# Patient Record
Sex: MALE | Race: WHITE | Employment: FULL TIME | URBAN - METROPOLITAN AREA
[De-identification: names, ages, dates, MRNs, and addresses within clinical notes are randomized per-mention and may not be internally consistent; named-entity substitution may affect disease eponyms.]

---

## 2019-10-24 ENCOUNTER — HOSPITAL ENCOUNTER (EMERGENCY)
Age: 19
Discharge: HOME | End: 2019-10-24
Attending: EMERGENCY MEDICINE

## 2019-10-24 VITALS
WEIGHT: 264 LBS | BODY MASS INDEX: 39.1 KG/M2 | TEMPERATURE: 97.9 F | SYSTOLIC BLOOD PRESSURE: 137 MMHG | DIASTOLIC BLOOD PRESSURE: 84 MMHG | HEIGHT: 69 IN | OXYGEN SATURATION: 99 % | RESPIRATION RATE: 18 BRPM | HEART RATE: 102 BPM

## 2019-10-24 DIAGNOSIS — L98.9 SKIN LESION: Primary | ICD-10-CM

## 2019-10-24 PROCEDURE — 99282 EMERGENCY DEPT VISIT SF MDM: CPT

## 2019-10-24 ASSESSMENT — PAIN SCALES - GENERAL: PAINLEVEL_OUTOF10: 4

## 2021-01-07 PROCEDURE — 93005 ELECTROCARDIOGRAM TRACING: CPT | Performed by: EMERGENCY MEDICINE

## 2021-01-07 PROCEDURE — 99283 EMERGENCY DEPT VISIT LOW MDM: CPT

## 2021-01-07 ASSESSMENT — PAIN SCALES - GENERAL: PAINLEVEL_OUTOF10: 7

## 2021-01-07 ASSESSMENT — PAIN DESCRIPTION - LOCATION: LOCATION: CHEST

## 2021-01-07 ASSESSMENT — PAIN DESCRIPTION - DESCRIPTORS: DESCRIPTORS: DISCOMFORT

## 2021-01-08 ENCOUNTER — HOSPITAL ENCOUNTER (EMERGENCY)
Age: 21
Discharge: HOME | End: 2021-01-08
Attending: EMERGENCY MEDICINE

## 2021-01-08 ENCOUNTER — APPOINTMENT (OUTPATIENT)
Dept: GENERAL RADIOLOGY | Age: 21
End: 2021-01-08

## 2021-01-08 VITALS
RESPIRATION RATE: 20 BRPM | WEIGHT: 265 LBS | OXYGEN SATURATION: 100 % | HEIGHT: 70 IN | BODY MASS INDEX: 37.94 KG/M2 | HEART RATE: 87 BPM | DIASTOLIC BLOOD PRESSURE: 74 MMHG | SYSTOLIC BLOOD PRESSURE: 124 MMHG | TEMPERATURE: 98.5 F

## 2021-01-08 VITALS
TEMPERATURE: 98.4 F | BODY MASS INDEX: 38.02 KG/M2 | RESPIRATION RATE: 16 BRPM | DIASTOLIC BLOOD PRESSURE: 81 MMHG | SYSTOLIC BLOOD PRESSURE: 120 MMHG | WEIGHT: 265 LBS | HEART RATE: 95 BPM | OXYGEN SATURATION: 96 %

## 2021-01-08 DIAGNOSIS — R07.89 ATYPICAL CHEST PAIN: Primary | ICD-10-CM

## 2021-01-08 DIAGNOSIS — R00.2 PALPITATIONS: Primary | ICD-10-CM

## 2021-01-08 LAB
ANION GAP SERPL CALCULATED.3IONS-SCNC: 11 MMOL/L (ref 3–16)
ANION GAP SERPL CALCULATED.3IONS-SCNC: 11 MMOL/L (ref 3–16)
BASOPHILS ABSOLUTE: 0 K/UL (ref 0–0.2)
BASOPHILS ABSOLUTE: 0.1 K/UL (ref 0–0.2)
BASOPHILS RELATIVE PERCENT: 0.5 %
BASOPHILS RELATIVE PERCENT: 0.9 %
BUN BLDV-MCNC: 11 MG/DL (ref 7–20)
BUN BLDV-MCNC: 12 MG/DL (ref 7–20)
CALCIUM SERPL-MCNC: 9.4 MG/DL (ref 8.3–10.6)
CALCIUM SERPL-MCNC: 9.9 MG/DL (ref 8.3–10.6)
CHLORIDE BLD-SCNC: 101 MMOL/L (ref 99–110)
CHLORIDE BLD-SCNC: 101 MMOL/L (ref 99–110)
CO2: 26 MMOL/L (ref 21–32)
CO2: 27 MMOL/L (ref 21–32)
CREAT SERPL-MCNC: 0.8 MG/DL (ref 0.9–1.3)
CREAT SERPL-MCNC: 0.8 MG/DL (ref 0.9–1.3)
D DIMER: <200 NG/ML DDU (ref 0–229)
EKG ATRIAL RATE: 170 BPM
EKG DIAGNOSIS: NORMAL
EKG Q-T INTERVAL: 258 MS
EKG QRS DURATION: 74 MS
EKG QTC CALCULATION (BAZETT): 433 MS
EKG R AXIS: 81 DEGREES
EKG T AXIS: 19 DEGREES
EKG VENTRICULAR RATE: 170 BPM
EOSINOPHILS ABSOLUTE: 0.1 K/UL (ref 0–0.6)
EOSINOPHILS ABSOLUTE: 0.2 K/UL (ref 0–0.6)
EOSINOPHILS RELATIVE PERCENT: 1.2 %
EOSINOPHILS RELATIVE PERCENT: 1.7 %
GFR AFRICAN AMERICAN: >60
GFR AFRICAN AMERICAN: >60
GFR NON-AFRICAN AMERICAN: >60
GFR NON-AFRICAN AMERICAN: >60
GLUCOSE BLD-MCNC: 76 MG/DL (ref 70–99)
GLUCOSE BLD-MCNC: 96 MG/DL (ref 70–99)
HCT VFR BLD CALC: 45.3 % (ref 40.5–52.5)
HCT VFR BLD CALC: 45.5 % (ref 40.5–52.5)
HEMOGLOBIN: 15.2 G/DL (ref 13.5–17.5)
HEMOGLOBIN: 15.3 G/DL (ref 13.5–17.5)
LYMPHOCYTES ABSOLUTE: 1.7 K/UL (ref 1–5.1)
LYMPHOCYTES ABSOLUTE: 2 K/UL (ref 1–5.1)
LYMPHOCYTES RELATIVE PERCENT: 16.6 %
LYMPHOCYTES RELATIVE PERCENT: 21.4 %
MCH RBC QN AUTO: 29 PG (ref 26–34)
MCH RBC QN AUTO: 29.1 PG (ref 26–34)
MCHC RBC AUTO-ENTMCNC: 33.5 G/DL (ref 31–36)
MCHC RBC AUTO-ENTMCNC: 33.7 G/DL (ref 31–36)
MCV RBC AUTO: 86.3 FL (ref 80–100)
MCV RBC AUTO: 86.7 FL (ref 80–100)
MONOCYTES ABSOLUTE: 0.8 K/UL (ref 0–1.3)
MONOCYTES ABSOLUTE: 0.9 K/UL (ref 0–1.3)
MONOCYTES RELATIVE PERCENT: 7.5 %
MONOCYTES RELATIVE PERCENT: 9.9 %
NEUTROPHILS ABSOLUTE: 6.2 K/UL (ref 1.7–7.7)
NEUTROPHILS ABSOLUTE: 7.4 K/UL (ref 1.7–7.7)
NEUTROPHILS RELATIVE PERCENT: 66.5 %
NEUTROPHILS RELATIVE PERCENT: 73.8 %
PDW BLD-RTO: 13.9 % (ref 12.4–15.4)
PDW BLD-RTO: 14 % (ref 12.4–15.4)
PLATELET # BLD: 219 K/UL (ref 135–450)
PLATELET # BLD: 230 K/UL (ref 135–450)
PMV BLD AUTO: 8.3 FL (ref 5–10.5)
PMV BLD AUTO: 8.4 FL (ref 5–10.5)
POTASSIUM REFLEX MAGNESIUM: 3.9 MMOL/L (ref 3.5–5.1)
POTASSIUM REFLEX MAGNESIUM: 4 MMOL/L (ref 3.5–5.1)
RBC # BLD: 5.23 M/UL (ref 4.2–5.9)
RBC # BLD: 5.27 M/UL (ref 4.2–5.9)
SODIUM BLD-SCNC: 138 MMOL/L (ref 136–145)
SODIUM BLD-SCNC: 139 MMOL/L (ref 136–145)
TROPONIN: <0.01 NG/ML
WBC # BLD: 10.1 K/UL (ref 4–11)
WBC # BLD: 9.3 K/UL (ref 4–11)

## 2021-01-08 PROCEDURE — 84484 ASSAY OF TROPONIN QUANT: CPT

## 2021-01-08 PROCEDURE — 36415 COLL VENOUS BLD VENIPUNCTURE: CPT

## 2021-01-08 PROCEDURE — 71045 X-RAY EXAM CHEST 1 VIEW: CPT

## 2021-01-08 PROCEDURE — 99283 EMERGENCY DEPT VISIT LOW MDM: CPT

## 2021-01-08 PROCEDURE — 80048 BASIC METABOLIC PNL TOTAL CA: CPT

## 2021-01-08 PROCEDURE — 84443 ASSAY THYROID STIM HORMONE: CPT

## 2021-01-08 PROCEDURE — 85379 FIBRIN DEGRADATION QUANT: CPT

## 2021-01-08 PROCEDURE — 93005 ELECTROCARDIOGRAM TRACING: CPT | Performed by: EMERGENCY MEDICINE

## 2021-01-08 PROCEDURE — 85025 COMPLETE CBC W/AUTO DIFF WBC: CPT

## 2021-01-08 ASSESSMENT — PAIN DESCRIPTION - ONSET: ONSET: ON-GOING

## 2021-01-08 ASSESSMENT — PAIN DESCRIPTION - LOCATION: LOCATION: CHEST

## 2021-01-08 ASSESSMENT — PAIN SCALES - GENERAL: PAINLEVEL_OUTOF10: 7

## 2021-01-08 ASSESSMENT — PAIN DESCRIPTION - DIRECTION: RADIATING_TOWARDS: L AND R

## 2021-01-08 ASSESSMENT — PAIN DESCRIPTION - DESCRIPTORS: DESCRIPTORS: ACHING

## 2021-01-08 ASSESSMENT — PAIN DESCRIPTION - FREQUENCY: FREQUENCY: CONTINUOUS

## 2021-01-08 NOTE — ED PROVIDER NOTES
[address] [facility]  EMERGENCY DEPARTMENT ENCOUNTER          ATTENDING PHYSICIAN NOTE       Date of evaluation: 1/7/2021    Chief Complaint     Palpitations      History of Present Illness     Jill Griffin is a 23 y.o. male who presents with complaint of palpitations and a feeling tightness in his chest.  He says the palpitations were a feeling of his heart beating rapidly in his chest but not necessarily irregularly. Says he was at rest when it started several hours ago. He developed mild feeling of chest tightness with it, not true dyspnea, and has not had shortness of breath is prevented him from being able to ambulate. He does not appear to be exertionally worse. Denies fevers chills or cough. Says he currently just feels the feeling of his heart beating fast.  Denies swelling in arms or legs. No trauma. Can think of no inciting factors for this. Denies caffeine or drug use. Review of Systems     Review of Systems  Pertinent positives and negatives are listed in the HPI; otherwise all systems are reviewed and were negative. Past Medical, Surgical, Family, and Social History     He has a past medical history of Myocarditis ([facility] [address] He has a past surgical history that includes Appendectomy (06/30/2016). His family history is not on file. He reports that he has never smoked. He does not have any smokeless tobacco history on file. He reports that he does not drink alcohol or use drugs. Medications     Previous Medications    No medications on file       Allergies     He has No Known Allergies. Physical Exam     INITIAL VITALS: BP: (!) 144/84,  , Pulse: 124, Resp: 20, SpO2: 100 %   Physical Exam  Constitutional:       Appearance: Normal appearance. HENT:      Head: Normocephalic. Eyes:      Conjunctiva/sclera: Conjunctivae normal.   Neck:      Musculoskeletal: Normal range of motion. Cardiovascular:      Rate and Rhythm: Regular rhythm. Tachycardia present.    Pulmonary:      Effort: Troponin <0.01 <0.01 ng/mL   D-dimer, quantitative (Lab)   Result Value Ref Range    D-Dimer, Quant <200 0 - 229 ng/mL DDU   Troponin   Result Value Ref Range    Troponin <0.01 <0.01 ng/mL       ED BEDSIDE ULTRASOUND:      RECENT VITALS:  BP: (!) 134/124, , Pulse: 124, Resp: 20, SpO2: 99 %     Procedures         ED Course     Nursing Notes, Past Medical Hx, Past Surgical Hx, Social Hx,Allergies, and Family Hx were reviewed. patient was given the following medications:  No orders of the defined types were placed in this encounter. CONSULTS:  None    MEDICAL DECISIONMAKING / ASSESSMENT / PLAN     Soheila Moise is a 23 y.o. male presents with a feeling of his heart beating rapidly this evening. His lab work-up is grossly unremarkable with a negative D-dimer which was obtained given his tachycardia, and negative troponin x2. His EKG as some significant artifact of scaring one lead but otherwise nonischemic and demonstrates sinus tachycardia. Tachycardia has improved spontaneously prior to my evaluation to approximately 100, and after further observation and a bag of fluids in the emergency department is at 90. He is asymptomatic currently. Discussed these findings with patient will have him follow-up with his PCP and return for any worsening symptoms or other concerns. .    Clinical Impression     1.  Palpitations        Disposition     PATIENT REFERRED TO:  SOCORRO Morrow - CNP  [id number] [address]  [phone]    In 3 days  As needed      DISCHARGE MEDICATIONS:  New Prescriptions    No medications on file       DISPOSITION Decision To Discharge 01/08/2021 05:11:13 AM         Marion Méndez MD  01/08/21 9118

## 2021-01-09 LAB
EKG ATRIAL RATE: 105 BPM
EKG DIAGNOSIS: NORMAL
EKG P AXIS: 44 DEGREES
EKG P-R INTERVAL: 140 MS
EKG Q-T INTERVAL: 352 MS
EKG QRS DURATION: 96 MS
EKG QTC CALCULATION (BAZETT): 465 MS
EKG R AXIS: 68 DEGREES
EKG T AXIS: 26 DEGREES
EKG VENTRICULAR RATE: 105 BPM
TSH REFLEX: 1.03 UIU/ML (ref 0.27–4.2)

## 2021-01-09 NOTE — ED TRIAGE NOTES
Pt was seen yesterday for rapid heart rate with chest pain. Today pt states he is experiencing the rapid heart rate with chest pain again at times the pain gets worse. Pt states the tachycardia began around noon there are times were it's not bad but it has not let up.

## 2021-01-09 NOTE — ED PROVIDER NOTES
12.4 - 15.4 %    Platelets 104 646 - 958 K/uL    MPV 8.3 5.0 - 10.5 fL    Neutrophils % 73.8 %    Lymphocytes % 16.6 %    Monocytes % 7.5 %    Eosinophils % 1.2 %    Basophils % 0.9 %    Neutrophils Absolute 7.4 1.7 - 7.7 K/uL    Lymphocytes Absolute 1.7 1.0 - 5.1 K/uL    Monocytes Absolute 0.8 0.0 - 1.3 K/uL    Eosinophils Absolute 0.1 0.0 - 0.6 K/uL    Basophils Absolute 0.1 0.0 - 0.2 K/uL   Basic Metabolic Panel w/ Reflex to MG   Result Value Ref Range    Sodium 139 136 - 145 mmol/L    Potassium reflex Magnesium 3.9 3.5 - 5.1 mmol/L    Chloride 101 99 - 110 mmol/L    CO2 27 21 - 32 mmol/L    Anion Gap 11 3 - 16    Glucose 76 70 - 99 mg/dL    BUN 12 7 - 20 mg/dL    CREATININE 0.8 (L) 0.9 - 1.3 mg/dL    GFR Non-African American >60 >60    GFR African American >60 >60    Calcium 9.9 8.3 - 10.6 mg/dL   Troponin   Result Value Ref Range    Troponin <0.01 <0.01 ng/mL       ED BEDSIDE ULTRASOUND:  Bedside cardiac ultrasound performed to evaluate for pericardial effusion. No pericardial effusion seen on my evaluation. RECENT VITALS:  BP: 120/81, Temp: 98.4 °F (36.9 °C),Pulse: 95, Resp: 16, SpO2: 96 %     Procedures         ED Course     Nursing Notes, Past Medical Hx, Past Surgical Hx, Social Hx, Allergies, and Family Hx werereviewed. The patient was given thefollowing medications:  Orders Placed This Encounter   Medications    aluminum & magnesium hydroxide-simethicone (MAALOX) 30 mL, lidocaine viscous hcl (XYLOCAINE) 5 mL (GI COCKTAIL)    naproxen (NAPROXEN) 500 MG EC tablet     Sig: Take 1 tablet by mouth 2 times daily (with meals)     Dispense:  30 tablet     Refill:  0       CONSULTS:  None    MEDICAL DECISION MAKING / ASSESSMENT / Margo Talya is a 23 y.o. male on my examination patient is very well-appearing complaining of atypical reproducible anterior chest pain. He was seen here yesterday had a negative work-up. Repeat troponin was performed.   Bedside cardiac ultrasound did not show any pericardial effusion. I did not feel that chest x-ray was necessary to repeat as is it was done less than 24 hours ago. Ultimately, atypical for ACS although given his history of myopericarditis he would benefit from cardiology follow-up. Troponin was negative today. Laboratory studies are overall reassuring. Ultimately I believe that he is safe for discharge home. I will discharge with a short course of naproxen. He was given cardiology information for follow-up. Clinical Impression     1.  Atypical chest pain        Disposition     PATIENT REFERRED TO:  Gloria Leach MD  [address]  [phone]      for cardiology follow up given history of myopericarditis      DISCHARGE MEDICATIONS:  Discharge Medication List as of 1/8/2021  9:54 PM      START taking these medications    Details   naproxen (NAPROXEN) 500 MG EC tablet Take 1 tablet by mouth 2 times daily (with meals), Disp-30 tablet, R-[id number]             DISPOSITION Decision To Discharge 01/08/2021 09:35:30 PM         Remi Faye MD  01/08/21 4000

## 2022-07-12 ENCOUNTER — HOSPITAL ENCOUNTER (EMERGENCY)
Age: 22
Discharge: HOME | End: 2022-07-13
Attending: EMERGENCY MEDICINE

## 2022-07-12 ENCOUNTER — APPOINTMENT (OUTPATIENT)
Dept: GENERAL RADIOLOGY | Age: 22
End: 2022-07-12

## 2022-07-12 DIAGNOSIS — H69.83 DYSFUNCTION OF BOTH EUSTACHIAN TUBES: ICD-10-CM

## 2022-07-12 DIAGNOSIS — R51.9 ACUTE NONINTRACTABLE HEADACHE, UNSPECIFIED HEADACHE TYPE: ICD-10-CM

## 2022-07-12 DIAGNOSIS — R42 DIZZINESS: ICD-10-CM

## 2022-07-12 DIAGNOSIS — R00.0 TACHYCARDIA: Primary | ICD-10-CM

## 2022-07-12 LAB
AMPHETAMINE SCREEN, URINE: NORMAL
ANION GAP SERPL CALCULATED.3IONS-SCNC: 11 MMOL/L (ref 3–16)
BACTERIA: ABNORMAL /HPF
BARBITURATE SCREEN URINE: NORMAL
BASOPHILS ABSOLUTE: 0 K/UL (ref 0–0.2)
BASOPHILS RELATIVE PERCENT: 0.2 %
BENZODIAZEPINE SCREEN, URINE: NORMAL
BILIRUBIN URINE: NEGATIVE
BLOOD, URINE: NEGATIVE
BUN BLDV-MCNC: 8 MG/DL (ref 7–20)
CALCIUM SERPL-MCNC: 9 MG/DL (ref 8.3–10.6)
CANNABINOID SCREEN URINE: NORMAL
CHLORIDE BLD-SCNC: 102 MMOL/L (ref 99–110)
CLARITY: CLEAR
CO2: 23 MMOL/L (ref 21–32)
COCAINE METABOLITE SCREEN URINE: NORMAL
COLOR: YELLOW
CREAT SERPL-MCNC: 0.8 MG/DL (ref 0.9–1.3)
EKG ATRIAL RATE: 127 BPM
EKG DIAGNOSIS: NORMAL
EKG P AXIS: 32 DEGREES
EKG P-R INTERVAL: 132 MS
EKG Q-T INTERVAL: 292 MS
EKG QRS DURATION: 86 MS
EKG QTC CALCULATION (BAZETT): 424 MS
EKG R AXIS: 87 DEGREES
EKG T AXIS: 21 DEGREES
EKG VENTRICULAR RATE: 127 BPM
EOSINOPHILS ABSOLUTE: 0.1 K/UL (ref 0–0.6)
EOSINOPHILS RELATIVE PERCENT: 1.2 %
GFR AFRICAN AMERICAN: >60
GFR NON-AFRICAN AMERICAN: >60
GLUCOSE BLD-MCNC: 113 MG/DL (ref 70–99)
GLUCOSE URINE: NEGATIVE MG/DL
HCT VFR BLD CALC: 46.1 % (ref 40.5–52.5)
HEMOGLOBIN: 15 G/DL (ref 13.5–17.5)
KETONES, URINE: NEGATIVE MG/DL
LEUKOCYTE ESTERASE, URINE: NEGATIVE
LYMPHOCYTES ABSOLUTE: 0.7 K/UL (ref 1–5.1)
LYMPHOCYTES RELATIVE PERCENT: 7.9 %
Lab: NORMAL
MCH RBC QN AUTO: 27.9 PG (ref 26–34)
MCHC RBC AUTO-ENTMCNC: 32.6 G/DL (ref 31–36)
MCV RBC AUTO: 85.7 FL (ref 80–100)
METHADONE SCREEN, URINE: NORMAL
MICROSCOPIC EXAMINATION: ABNORMAL
MONOCYTES ABSOLUTE: 0.6 K/UL (ref 0–1.3)
MONOCYTES RELATIVE PERCENT: 7.2 %
NEUTROPHILS ABSOLUTE: 7.2 K/UL (ref 1.7–7.7)
NEUTROPHILS RELATIVE PERCENT: 83.5 %
NITRITE, URINE: NEGATIVE
OPIATE SCREEN URINE: NORMAL
OXYCODONE URINE: NORMAL
PDW BLD-RTO: 14.6 % (ref 12.4–15.4)
PH UA: 7.5
PH UA: 7.5 (ref 5–8)
PHENCYCLIDINE SCREEN URINE: NORMAL
PLATELET # BLD: 202 K/UL (ref 135–450)
PMV BLD AUTO: 9 FL (ref 5–10.5)
POTASSIUM REFLEX MAGNESIUM: 4 MMOL/L (ref 3.5–5.1)
PROPOXYPHENE SCREEN: NORMAL
PROTEIN UA: NEGATIVE MG/DL
RBC # BLD: 5.38 M/UL (ref 4.2–5.9)
RBC UA: ABNORMAL /HPF (ref 0–4)
SODIUM BLD-SCNC: 136 MMOL/L (ref 136–145)
SPECIFIC GRAVITY UA: 1.02 (ref 1–1.03)
TROPONIN: <0.01 NG/ML
URINE TYPE: ABNORMAL
UROBILINOGEN, URINE: 0.2 E.U./DL
WBC # BLD: 8.7 K/UL (ref 4–11)
WBC UA: ABNORMAL /HPF (ref 0–5)

## 2022-07-12 PROCEDURE — 96374 THER/PROPH/DIAG INJ IV PUSH: CPT

## 2022-07-12 PROCEDURE — 71046 X-RAY EXAM CHEST 2 VIEWS: CPT

## 2022-07-12 PROCEDURE — 84484 ASSAY OF TROPONIN QUANT: CPT

## 2022-07-12 PROCEDURE — 99285 EMERGENCY DEPT VISIT HI MDM: CPT

## 2022-07-12 PROCEDURE — 80307 DRUG TEST PRSMV CHEM ANLYZR: CPT

## 2022-07-12 PROCEDURE — 85025 COMPLETE CBC W/AUTO DIFF WBC: CPT

## 2022-07-12 PROCEDURE — 80048 BASIC METABOLIC PNL TOTAL CA: CPT

## 2022-07-12 PROCEDURE — 96375 TX/PRO/DX INJ NEW DRUG ADDON: CPT

## 2022-07-12 PROCEDURE — 6360000002 HC RX W HCPCS: Performed by: EMERGENCY MEDICINE

## 2022-07-12 PROCEDURE — 2580000003 HC RX 258: Performed by: STUDENT IN AN ORGANIZED HEALTH CARE EDUCATION/TRAINING PROGRAM

## 2022-07-12 PROCEDURE — 36415 COLL VENOUS BLD VENIPUNCTURE: CPT

## 2022-07-12 PROCEDURE — 81001 URINALYSIS AUTO W/SCOPE: CPT

## 2022-07-12 PROCEDURE — 6370000000 HC RX 637 (ALT 250 FOR IP): Performed by: PHYSICIAN ASSISTANT

## 2022-07-12 PROCEDURE — 84439 ASSAY OF FREE THYROXINE: CPT

## 2022-07-12 PROCEDURE — 84443 ASSAY THYROID STIM HORMONE: CPT

## 2022-07-12 PROCEDURE — 93005 ELECTROCARDIOGRAM TRACING: CPT | Performed by: STUDENT IN AN ORGANIZED HEALTH CARE EDUCATION/TRAINING PROGRAM

## 2022-07-12 PROCEDURE — 6360000002 HC RX W HCPCS: Performed by: PHYSICIAN ASSISTANT

## 2022-07-12 RX ORDER — KETOROLAC TROMETHAMINE 30 MG/ML
15 INJECTION, SOLUTION INTRAMUSCULAR; INTRAVENOUS ONCE
Status: COMPLETED | OUTPATIENT
Start: 2022-07-12 | End: 2022-07-12

## 2022-07-12 RX ORDER — 0.9 % SODIUM CHLORIDE 0.9 %
500 INTRAVENOUS SOLUTION INTRAVENOUS ONCE
Status: COMPLETED | OUTPATIENT
Start: 2022-07-12 | End: 2022-07-12

## 2022-07-12 RX ORDER — PROCHLORPERAZINE EDISYLATE 5 MG/ML
10 INJECTION INTRAMUSCULAR; INTRAVENOUS ONCE
Status: COMPLETED | OUTPATIENT
Start: 2022-07-12 | End: 2022-07-12

## 2022-07-12 RX ORDER — DIPHENHYDRAMINE HYDROCHLORIDE 50 MG/ML
50 INJECTION INTRAMUSCULAR; INTRAVENOUS ONCE
Status: COMPLETED | OUTPATIENT
Start: 2022-07-12 | End: 2022-07-12

## 2022-07-12 RX ORDER — SODIUM CHLORIDE, SODIUM LACTATE, POTASSIUM CHLORIDE, AND CALCIUM CHLORIDE .6; .31; .03; .02 G/100ML; G/100ML; G/100ML; G/100ML
30 INJECTION, SOLUTION INTRAVENOUS ONCE
Status: COMPLETED | OUTPATIENT
Start: 2022-07-12 | End: 2022-07-12

## 2022-07-12 RX ORDER — MECLIZINE HYDROCHLORIDE 25 MG/1
25 TABLET ORAL ONCE
Status: COMPLETED | OUTPATIENT
Start: 2022-07-12 | End: 2022-07-12

## 2022-07-12 RX ADMIN — PROCHLORPERAZINE EDISYLATE 10 MG: 5 INJECTION, SOLUTION INTRAMUSCULAR; INTRAVENOUS at 21:59

## 2022-07-12 RX ADMIN — SODIUM CHLORIDE, POTASSIUM CHLORIDE, SODIUM LACTATE AND CALCIUM CHLORIDE 3606 ML: 600; 310; 30; 20 INJECTION, SOLUTION INTRAVENOUS at 19:02

## 2022-07-12 RX ADMIN — MECLIZINE HYDROCHLORIDE 25 MG: 25 TABLET ORAL at 23:38

## 2022-07-12 RX ADMIN — KETOROLAC TROMETHAMINE 15 MG: 30 INJECTION, SOLUTION INTRAMUSCULAR at 20:15

## 2022-07-12 RX ADMIN — DIPHENHYDRAMINE HYDROCHLORIDE 50 MG: 50 INJECTION, SOLUTION INTRAMUSCULAR; INTRAVENOUS at 21:59

## 2022-07-12 RX ADMIN — SODIUM CHLORIDE 500 ML: 900 INJECTION, SOLUTION INTRAVENOUS at 17:32

## 2022-07-12 ASSESSMENT — PAIN SCALES - GENERAL
PAINLEVEL_OUTOF10: 8
PAINLEVEL_OUTOF10: 7

## 2022-07-12 ASSESSMENT — PAIN DESCRIPTION - DESCRIPTORS
DESCRIPTORS: ACHING
DESCRIPTORS: ACHING;THROBBING

## 2022-07-12 ASSESSMENT — PAIN - FUNCTIONAL ASSESSMENT: PAIN_FUNCTIONAL_ASSESSMENT: 0-10

## 2022-07-12 ASSESSMENT — PAIN DESCRIPTION - LOCATION
LOCATION: HEAD
LOCATION: HEAD

## 2022-07-12 ASSESSMENT — PAIN DESCRIPTION - FREQUENCY: FREQUENCY: CONTINUOUS

## 2022-07-12 ASSESSMENT — PAIN DESCRIPTION - ORIENTATION: ORIENTATION: ANTERIOR

## 2022-07-12 ASSESSMENT — PAIN DESCRIPTION - PAIN TYPE: TYPE: ACUTE PAIN

## 2022-07-12 NOTE — ED PROVIDER NOTES
[address] [facility]          EM RESIDENT NOTE       Date of evaluation: 7/12/2022    Chief Complaint     Headache (Pt reports dizziness and headache starting about 3:30 today. HR is 130's), Dizziness, and Tachycardia      of Present Illness     Leoncio Shannon is a 23 y.o. male past medical history of myocarditis of unknown diagnosis with cardiac MRI showing myocardial injury (normal echocardiogram at that time) presents ED for lightheadedness/dizziness, headache and nausea. Patient states that around 330 this afternoon, he started developing a slight headache with lightheaded/dizziness and nausea. He had ongoing symptoms that were persistent and therefore he presented the ED via EMS for further evaluation. Patient states he continues to have a little bit of lightheaded/dizziness and a small headache, but denies any additional symptoms. Patient specifically denies fever, chills, changes in vision, chest pain/palpitations, shortness of breath, abdominal pain, vomiting/diarrhea, lower extremity mid rash. Review of Systems     Denies fever, chills, changes in vision, chest pain/palpitations, shortness of breath, abdominal pain, vomiting/diarrhea, lower extremity mid rash. All other systems were reviewed and were negative. Past Medical, Surgical, Family, and Social History     He has a past medical history of Myocarditis ([facility] [address] He has a past surgical history that includes Appendectomy (06/30/2016). His family history is not on file. He reports that he has never smoked. He has never used smokeless tobacco. He reports that he does not drink alcohol and does not use drugs. Medications     Previous Medications    No medications on file       Allergies     He has No Known Allergies.     Physical Exam     INITIAL VITALS: BP: (!) 130/90, Temp: 99.3 °F (37.4 °C), Heart Rate: (!) 134, Resp: 20, SpO2: 97 %     General:   Well-appearing, obese, sitting comfortably in the gurney no appearing, did not appear anxious and had no focal infectious symptoms. EKG showing sinus tachycardia and lab workup was unremarkable. No troponin elevation to suggest myocarditis. TTE without significant cardiac effusion. CXR without signs of pulmonary etiology. On reassessment after 1L fluids, patient continued to have ongoing tachycardia to 120. Patient did have headache. After further questioning, patient states this happens frequently, sometimes as often as once a week. Another liter of fluids was ordered as well as urine and thyroid studies which are pending. If patients HR improves after fluid, he would be appropriate for discharge with follow up with cardiology for possible holter monitor. This patient was also evaluated by the attending physician. All care plans werediscussed and agreed upon. At this time I am going off-service and will be signing out care of this patient to my colleague Praveen Eagle for further care. My colleague's responsibilities will include:    -reassess/dispo          Clinical Impression     1. Nonintractable headache, unspecified chronicity pattern, unspecified headache type    2. Tachycardia        Disposition     PATIENT REFERRED TO:  No follow-up provider specified.     DISCHARGE MEDICATIONS:  New Prescriptions    No medications on file       DISPOSITION       Adriana Anne MD  07/12/22 2012

## 2022-07-12 NOTE — Clinical Note
Can Tejada was seen and treated in our emergency department on 7/12/2022. He may return to work on 07/14/2022. If you have any questions or concerns, please don't hesitate to call.       TIFFANI Nicole

## 2022-07-12 NOTE — Clinical Note
Mario Hernandez was seen and treated in our emergency department on 7/12/2022. He may return to work on 07/14/2022. If you have any questions or concerns, please don't hesitate to call.       TIFFANI Ta

## 2022-07-12 NOTE — ED PROVIDER NOTES
ED Attending Attestation Note     Date of evaluation: 7/12/2022    This patient was seen by the resident. I have seen and examined the patient, agree with the workup, evaluation, management and diagnosis. The care plan has been discussed. I have reviewed the ECG and concur with the resident's interpretation. My assessment reveals a generally well-appearing, obese young gentleman, pleasantly conversational, in no acute distress. He presents to the emergency department complaining of a feeling of dizziness and a headache, which he states is mostly frontal, aching and throbbing in character. The patient was found to be significantly tachycardic on arrival, with a heart rate in the 130s, sinus tachycardia on EKG. Patient had a rather similar presentation about 18 months ago, complaining of lightheadedness and a headache, potentially also at that time with palpitations, and was found to have sinus tachycardia of unclear etiology, but has not followed up. My discussion with the patient, he states that he is not experiencing any chest discomfort, shortness of breath, palpitations currently, but is only feeling lightheaded and with a headache. He states that he experiences episodes of this sensation of lightheadedness and headache on a regular basis, sometimes weekly, sometimes monthly, sometimes somewhat more less frequently. He does not check his heart rate during these episodes, but does not feel palpitations, chest discomfort, or shortness of breath. We are hydrating the patient, will treat his headache, and monitor whether this improves his tachycardia. However, given that the patient has been experiencing intermittent episodes of these symptoms on a frequent basis over at least the last 18 months, he likely does not require admission, but would benefit from close outpatient Cardiologic follow-up, and likely an outpatient Holter monitor and further evaluation.          Chavez Rojas MD  07/12/22 5644

## 2022-07-13 VITALS
BODY MASS INDEX: 37.94 KG/M2 | DIASTOLIC BLOOD PRESSURE: 86 MMHG | OXYGEN SATURATION: 97 % | RESPIRATION RATE: 11 BRPM | SYSTOLIC BLOOD PRESSURE: 155 MMHG | WEIGHT: 265 LBS | TEMPERATURE: 99.3 F | HEART RATE: 101 BPM | HEIGHT: 70 IN

## 2022-07-13 LAB
T4 FREE: 1.1 NG/DL (ref 0.9–1.8)
TSH SERPL DL<=0.05 MIU/L-ACNC: 0.54 UIU/ML (ref 0.27–4.2)

## 2022-07-13 PROCEDURE — 6370000000 HC RX 637 (ALT 250 FOR IP): Performed by: PHYSICIAN ASSISTANT

## 2022-07-13 RX ORDER — OXYMETAZOLINE HYDROCHLORIDE 0.05 G/100ML
2 SPRAY NASAL ONCE
Status: COMPLETED | OUTPATIENT
Start: 2022-07-13 | End: 2022-07-13

## 2022-07-13 RX ADMIN — OXYMETAZOLINE HCL 2 SPRAY: 0.05 SPRAY NASAL at 01:26

## 2022-08-02 NOTE — ED PROVIDER NOTES
[address] ENCOUNTER          PHYSICIAN ASSISTANT NOTE       Date of evaluation: 7/12/2022    Chief Complaint     Headache (Pt reports dizziness and headache starting about 3:30 today. HR is 130's), Dizziness, and Tachycardia        ADDENDUM:      Care of this patient was assumed from my colleague, Dr. Sabrina Andrews. The patient was seen for Headache (Pt reports dizziness and headache starting about 3:30 today. HR is 130's), Dizziness, and Tachycardia  . The patient's initial evaluation and plan have been discussed with the prior provider who initially evaluated the patient. Nursing Notes, Past Medical Hx, Past Surgical Hx, Social Hx, Allergies, and Family Hx were all reviewed. Physical Exam     INITIAL VITALS: BP: (!) 130/90, Temp: 99.3 °F (37.4 °C), Heart Rate: (!) 134, Resp: 20, SpO2: 97 %     Nursing note and vitals reviewed. Physical Exam  Vitals and nursing note reviewed. Constitutional:       General: He is awake. He is not in acute distress. Appearance: He is well-developed. He is not ill-appearing or diaphoretic. HENT:      Head: Normocephalic and atraumatic. Nose: Congestion and rhinorrhea present. Mouth/Throat:      Mouth: Mucous membranes are moist. No injury. Eyes:      General: No visual field deficit. Pupils: Pupils are equal, round, and reactive to light. Neck:      Vascular: No JVD. Cardiovascular:      Rate and Rhythm: Normal rate and regular rhythm. Pulmonary:      Effort: Pulmonary effort is normal. No respiratory distress. Breath sounds: Normal breath sounds. No stridor. No wheezing, rhonchi or rales. Chest:      Chest wall: No tenderness. Abdominal:      General: There is no distension. Palpations: Abdomen is soft. Tenderness: There is no abdominal tenderness. There is no guarding or rebound. Musculoskeletal:         General: No tenderness. Normal range of motion.       Cervical back: Full passive range of motion without pain, normal range of motion and neck supple. Skin:     General: Skin is warm and dry. Coloration: Skin is not pale. Findings: No erythema or rash. Neurological:      General: No focal deficit present. Mental Status: He is alert and oriented to person, place, and time. GCS: GCS eye subscore is 4. GCS verbal subscore is 5. GCS motor subscore is 6. Cranial Nerves: No cranial nerve deficit, dysarthria or facial asymmetry. Motor: No weakness, abnormal muscle tone or pronator drift. Coordination: Coordination normal.   Psychiatric:         Attention and Perception: Attention normal.         Mood and Affect: Mood and affect normal.         Speech: Speech normal.        Procedures     N/A    MEDICAL DECISION MAKING     Tanvi Blood is admitted to the Emergency Department for evaluation of his chief complaint as described in the history of present illness. Complete history and physical was performed by me and my attending. Nursing notes, past medical history, surgical history, family history and social history were reviewed and addressed in the HPI. Jigar Morales is a [address] y.o. male who presents to the emergency department with a complaint of tachycardia. The patient was previously evaluated by the prior emergency department team.  Please see their note for their assessment, evaluation, diagnostics and plan. Briefly, the patient presented to the emergency department for tachycardia. The patient presented to the emergency department with isolated tachycardia in the 130-140 range. His evaluation in the emergency department demonstrated sinus tachycardia without evidence of ischemia, injury or infarct, negative troponins, normal chest x-ray and unremarkable labs. He has thyroid labs pending, but they would not come back in real-time tonight. Has had these labs drawn in the past without significant abnormality.   The patient has been administered IV fluids, which demonstrated some slight improvement in his rate, but he continues to demonstrate tachycardia at around 120. Also has an associated headache, but reports this happens frequently, sometimes as often as once a week. At the time of turnover, the patient was pending repeat evaluation after second liter of IV fluid administration and follow-up on urinalysis. The patient's urinalysis demonstrated no evidence of infection, ketonuria or other significant abnormality. Urine drug screen was performed and was unremarkable. The patient continues to report a headache, andmay be an atypical presentation of migraine, the patient was administered a migraine cocktail including Compazine, Benadryl and Toradol, which provided some improvement in the patient's headache, but he demonstrates continued tachycardia and continues to demonstrate the slight room spinning sensation previously reported. He has been treated with meclizine for this in the past.  The patient was administered meclizine in the emergency department, but with minimal improvement in his tachycardia. On repeat evaluation, the patient was noted to have significant tenderness to pressure with signs of nasal congestion and postnasal drip. This congestion may be causing some eustachian tube dysfunction resulting in the vertigo-like symptoms and ultimately the patient's tachycardia. He was administered oxymetazoline which brought his heart rate down into the low 100s, significantly improved from his initial presentation, and the patient reports that he feels significantly better. He will continue this therapy at home and will be provided instructions on the management of irritation to bisantrene versus URI. He will follow-up with his primary care, cardiology and ENT as needed. I discussed this plan at length the patient who verbalizes understanding and is in agreement. The patient is currently stable and will be discharged home for continued self-care.   Please see patient's AVS for additional discharge instructions. The patient was seen and evaluated by myself and the attending physician, Mariano Gavin MD, who agrees with my assessment, treatment and plan. Clinical Impression     1. Tachycardia    2. Dizziness    3. Acute nonintractable headache, unspecified headache type    4. Dysfunction of both eustachian tubes        Disposition     DISPOSITION Decision To Discharge 07/13/2022 12:32:20 AM        Alva Balderas. SHREYA Chowdary  9:12 AM    Relevant History and Diagnostic Information     Past Medical, Surgical, Family, and Social History     He has a past medical history of Myocarditis ([facility] [address] He has a past surgical history that includes Appendectomy (06/30/2016). His family history is not on file. He reports that he has never smoked. He has never used smokeless tobacco. He reports that he does not drink alcohol and does not use drugs. Medications     Discharge Medication List as of 7/13/2022  2:21 AM          Allergies     He has No Known Allergies. ED Course     Nursing Notes, Past Medical Hx, Past Surgical Hx, Social Hx,Allergies, and Family Hx were reviewed.     Patient was given the following medications:  Orders Placed This Encounter   Medications    0.9 % sodium chloride bolus    lactated ringers bolus    ketorolac (TORADOL) injection 15 mg    prochlorperazine (COMPAZINE) injection 10 mg    diphenhydrAMINE (BENADRYL) injection 50 mg    meclizine (ANTIVERT) tablet 25 mg    oxymetazoline (AFRIN) 0.05 % nasal spray 2 spray       Diagnostic Results     RECENT VITALS:  BP: (!) 155/86,Temp: 99.3 °F (37.4 °C), Heart Rate: (!) 101, Resp: 11, SpO2: 97 %     RADIOLOGY:  XR CHEST (2 VW)   Final Result      No acute cardiopulmonary disease          LABS:   Results for orders placed or performed during the hospital encounter of 07/12/22   CBC with Auto Differential   Result Value Ref Range    WBC 8.7 4.0 - 11.0 K/uL    RBC 5.38 4.20 - 5.90 M/uL    Hemoglobin 15.0 13.5 - 17.5 g/dL    Hematocrit 46.1 40.5 - 52.5 %    MCV 85.7 80.0 - 100.0 fL    MCH 27.9 26.0 - 34.0 pg    MCHC 32.6 31.0 - 36.0 g/dL    RDW 14.6 12.4 - 15.4 %    Platelets 594 503 - 024 K/uL    MPV 9.0 5.0 - 10.5 fL    Neutrophils % 83.5 %    Lymphocytes % 7.9 %    Monocytes % 7.2 %    Eosinophils % 1.2 %    Basophils % 0.2 %    Neutrophils Absolute 7.2 1.7 - 7.7 K/uL    Lymphocytes Absolute 0.7 (L) 1.0 - 5.1 K/uL    Monocytes Absolute 0.6 0.0 - 1.3 K/uL    Eosinophils Absolute 0.1 0.0 - 0.6 K/uL    Basophils Absolute 0.0 0.0 - 0.2 K/uL   Basic Metabolic Panel w/ Reflex to MG   Result Value Ref Range    Sodium 136 136 - 145 mmol/L    Potassium reflex Magnesium 4.0 3.5 - 5.1 mmol/L    Chloride 102 99 - 110 mmol/L    CO2 23 21 - 32 mmol/L    Anion Gap 11 3 - 16    Glucose 113 (H) 70 - 99 mg/dL    BUN 8 7 - 20 mg/dL    Creatinine 0.8 (L) 0.9 - 1.3 mg/dL    GFR Non-African American >60 >60    GFR African American >60 >60    Calcium 9.0 8.3 - 10.6 mg/dL   Troponin   Result Value Ref Range    Troponin <0.01 <0.01 ng/mL   Urinalysis with Microscopic   Result Value Ref Range    Color, UA Yellow Straw/Yellow    Clarity, UA Clear Clear    Glucose, Ur Negative Negative mg/dL    Bilirubin Urine Negative Negative    Ketones, Urine Negative Negative mg/dL    Specific Gravity, UA 1.020 1.005 - 1.030    Blood, Urine Negative Negative    pH, UA 7.5 5.0 - 8.0    Protein, UA Negative Negative mg/dL    Urobilinogen, Urine 0.2 <2.0 E.U./dL    Nitrite, Urine Negative Negative    Leukocyte Esterase, Urine Negative Negative    Microscopic Examination Not Indicated     Urine Type NotGiven     WBC, UA 0-2 0 - 5 /HPF    RBC, UA 0-2 0 - 4 /HPF    Bacteria, UA Rare (A) None Seen /HPF   Urine Drug Screen   Result Value Ref Range    Amphetamine Screen, Urine Neg Negative <1000ng/mL    Barbiturate Screen, Ur Neg Negative <200 ng/mL    Benzodiazepine Screen, Urine Neg Negative <200 ng/mL    Cannabinoid Scrn, Ur Neg Negative <50 ng/mL    Cocaine Metabolite Screen, Urine Neg Negative <300 ng/mL    Opiate Scrn, Ur Neg Negative <300 ng/mL    PCP Screen, Urine Neg Negative <25 ng/mL    Methadone Screen, Urine Neg Negative <300 ng/mL    Propoxyphene Scrn, Ur Neg Negative <300 ng/mL    Oxycodone Urine Neg Negative <100 ng/ml    pH, UA 7.5     Drug Screen Comment: see below    T4, Free   Result Value Ref Range    T4 Free 1.1 0.9 - 1.8 ng/dL   TSH   Result Value Ref Range    TSH 0.54 0.27 - 4.20 uIU/mL   EKG 12 Lead   Result Value Ref Range    Ventricular Rate 127 BPM    Atrial Rate 127 BPM    P-R Interval 132 ms    QRS Duration 86 ms    Q-T Interval 292 ms    QTc Calculation (Bazett) 424 ms    P Axis 32 degrees    R Axis 87 degrees    T Axis 21 degrees    Diagnosis       EKG performed in ER and to be interpreted by ER physician. Confirmed by MD, ER ([id number]),  Kei Sotelo ([id number]) on 7/12/2022 5:29:42 PM       CONSULTS:  None    PATIENT REFERRED TO:  [facility]  [id number]  On license of [facility] [address] [facility]  [phone]    Schedule an appointment as soon as possible for a visit in 3 days  If symptoms or not improving on the prescribed regimen      DISCHARGE MEDICATIONS:  Discharge Medication List as of 7/13/2022  2:21 AM            [address], [place]  08/02/22 0567

## 2022-11-20 ENCOUNTER — HOSPITAL ENCOUNTER (EMERGENCY)
Age: 22
Discharge: HOME | End: 2022-11-20
Attending: STUDENT IN AN ORGANIZED HEALTH CARE EDUCATION/TRAINING PROGRAM

## 2022-11-20 VITALS
HEART RATE: 75 BPM | TEMPERATURE: 97.8 F | DIASTOLIC BLOOD PRESSURE: 97 MMHG | SYSTOLIC BLOOD PRESSURE: 153 MMHG | OXYGEN SATURATION: 98 % | HEIGHT: 70 IN | BODY MASS INDEX: 43.67 KG/M2 | WEIGHT: 305 LBS

## 2022-11-20 DIAGNOSIS — R09.81 SINUS CONGESTION: Primary | ICD-10-CM

## 2022-11-20 LAB
EKG ATRIAL RATE: 77 BPM
EKG DIAGNOSIS: NORMAL
EKG P AXIS: 25 DEGREES
EKG P-R INTERVAL: 136 MS
EKG Q-T INTERVAL: 374 MS
EKG QRS DURATION: 86 MS
EKG QTC CALCULATION (BAZETT): 423 MS
EKG R AXIS: 19 DEGREES
EKG T AXIS: 23 DEGREES
EKG VENTRICULAR RATE: 77 BPM

## 2022-11-20 PROCEDURE — 93005 ELECTROCARDIOGRAM TRACING: CPT | Performed by: STUDENT IN AN ORGANIZED HEALTH CARE EDUCATION/TRAINING PROGRAM

## 2022-11-20 PROCEDURE — 99283 EMERGENCY DEPT VISIT LOW MDM: CPT

## 2022-11-20 RX ORDER — ECHINACEA PURPUREA EXTRACT 125 MG
1 TABLET ORAL PRN
Qty: 1 EACH | Refills: 0 | Status: SHIPPED | OUTPATIENT
Start: 2022-11-20

## 2022-11-20 RX ORDER — FLUTICASONE PROPIONATE 50 MCG
2 SPRAY, SUSPENSION (ML) NASAL DAILY
Qty: 16 G | Refills: 0 | Status: SHIPPED | OUTPATIENT
Start: 2022-11-20

## 2022-11-20 RX ORDER — ACETAMINOPHEN 325 MG/1
650 TABLET ORAL
COMMUNITY
Start: 2015-02-17

## 2022-11-20 RX ORDER — GUAIFENESIN 600 MG/1
1200 TABLET, EXTENDED RELEASE ORAL 2 TIMES DAILY
Qty: 40 TABLET | Refills: 0 | Status: SHIPPED | OUTPATIENT
Start: 2022-11-20 | End: 2022-11-30

## 2022-11-20 RX ORDER — AZITHROMYCIN 250 MG/1
250 TABLET, FILM COATED ORAL DAILY
COMMUNITY

## 2022-11-20 ASSESSMENT — ENCOUNTER SYMPTOMS
SINUS PAIN: 1
EYES NEGATIVE: 1
FACIAL SWELLING: 0
DIARRHEA: 0
ABDOMINAL PAIN: 0
SINUS PRESSURE: 1
BACK PAIN: 0
WHEEZING: 0
CHEST TIGHTNESS: 0
RESPIRATORY NEGATIVE: 1
SORE THROAT: 0
RHINORRHEA: 0
SHORTNESS OF BREATH: 0
COUGH: 0
CONSTIPATION: 0
NAUSEA: 0
VOMITING: 0
TROUBLE SWALLOWING: 0

## 2022-11-20 ASSESSMENT — PAIN DESCRIPTION - DESCRIPTORS
DESCRIPTORS_2: THROBBING;PRESSURE
DESCRIPTORS: PRESSURE

## 2022-11-20 ASSESSMENT — PAIN DESCRIPTION - ORIENTATION
ORIENTATION: LEFT
ORIENTATION_2: RIGHT

## 2022-11-20 ASSESSMENT — PAIN DESCRIPTION - LOCATION
LOCATION: FACE
LOCATION_2: CHEST

## 2022-11-20 ASSESSMENT — PAIN DESCRIPTION - INTENSITY: RATING_2: 3

## 2022-11-20 ASSESSMENT — PAIN - FUNCTIONAL ASSESSMENT
PAIN_FUNCTIONAL_ASSESSMENT: ACTIVITIES ARE NOT PREVENTED
PAIN_FUNCTIONAL_ASSESSMENT: 0-10

## 2022-11-20 ASSESSMENT — PAIN SCALES - GENERAL: PAINLEVEL_OUTOF10: 9

## 2022-11-20 ASSESSMENT — PAIN DESCRIPTION - FREQUENCY: FREQUENCY: CONTINUOUS

## 2022-11-20 NOTE — ED NOTES
Pt discharged with Rx x 3 and written instructions.  Pt verbalizes understanding and walked to darling Benjamin RN  11/20/22 4074

## 2022-11-20 NOTE — DISCHARGE INSTRUCTIONS
-Continue oral antibiotic until completely finished  -Drink fluids to stay well-hydrated  -Alternate Tylenol and ibuprofen for pain, fevers  -Fill and begin Mucinex, Flonase and saline nasal rinse as prescribed  -Follow-up with your primary care physician or physician of your choice from the list provided  -Return for worsening symptoms such as fevers of 100.5 or greater not relieved by Tylenol or Motrin, difficulty swallowing saliva or fluids, shortness of breath or other concerns

## 2022-11-20 NOTE — ED PROVIDER NOTES
ED Attending Attestation Note     Date of evaluation: 11/20/2022    This patient was seen by the advance practice provider. I have seen and examined the patient, agree with the workup, evaluation, management and diagnosis. The care plan has been discussed. I have reviewed the ECG and concur with the SAMANTHA's interpretation. My assessment reveals a well-appearing 69-year-old male who presents with sinus/facial pain in the setting of a recently diagnosed sinus infection. He was started on a azithromycin pack about 2 days ago, has been adherent to his medications, and came in today primarily for complaints of pain related to the sinus infection. He also had a brief episode of chest pain that was transient and resolved earlier today. He appears well without any fever and a normal blood pressure. He has no evidence of airway obstruction and has normal cardiopulmonary exam.  He does not have any chest pain on my evaluation. Clinically, he does not appear to have any complications of his recent sinus infection, including no evidence of meningitis, cavernous sinus thrombosis, mastoiditis or other head and neck soft tissue infection. He was screened with an EKG given he has a history of myocarditis remotely, and this was unchanged from his baseline and did not have any apparent concerns. Given the transient nature of his chest pain, do not think that he requires further work-up. We will provide him symptom relief with decongestants. He was feeling better at the time of discharge.      Mack Crawley MD  11/20/22 [id number]

## 2022-11-20 NOTE — ED PROVIDER NOTES
[facility] ENCOUNTER          PHYSICIAN ASSISTANT NOTE       Date of evaluation: 11/20/2022    Chief Complaint     Facial Pain and Chest Pain      History of Present Illness     Veronica Sapp is a 23 y.o. male who presents to the emergency department with complaints of having a sinus infection. The patient states he has left-sided sinus pressure and facial pain. He was seen at an urgent care and started on a Z-Mohit 2 days ago. He states he was up most of the night because Tylenol is not helping his pain. He denies fevers or chills. Denies a sore throat, difficulty swallowing saliva or fluids. Denies ear pain. He states he had 1 brief episode of right-sided chest pain that resolved. He states in the past he has had issues with palpitations and tachycardia but this does not feel similar. He no longer has chest pain. He denies shortness of breath, cough or rhinorrhea. Review of Systems     Review of Systems   Constitutional:  Negative for chills and fever. HENT:  Positive for congestion, postnasal drip, sinus pressure and sinus pain. Negative for ear discharge, ear pain, facial swelling, rhinorrhea, sneezing, sore throat and trouble swallowing. Eyes: Negative. Respiratory: Negative. Negative for cough, chest tightness, shortness of breath and wheezing. Cardiovascular:  Positive for chest pain. Negative for palpitations and leg swelling. Brief episode of chest pain, resolved   Gastrointestinal:  Negative for abdominal pain, constipation, diarrhea, nausea and vomiting. Endocrine: Negative. Genitourinary: Negative. Negative for difficulty urinating, dysuria and frequency. Musculoskeletal:  Negative for back pain and neck pain. Neurological: Negative. Negative for dizziness, weakness, light-headedness and headaches. Psychiatric/Behavioral: Negative. All other systems reviewed and are negative.     Past Medical, Surgical, Family, and Social History     He has a past medical history of Myocarditis ([facility] [address] He has a past surgical history that includes Appendectomy (06/30/2016). His family history is not on file. He reports that he has never smoked. He has never used smokeless tobacco. He reports that he does not drink alcohol and does not use drugs. Medications     Previous Medications    ACETAMINOPHEN (TYLENOL) 325 MG TABLET    Take 650 mg by mouth every 4-6 hours as needed    AZITHROMYCIN (ZITHROMAX) 250 MG TABLET    Take 250 mg by mouth daily       Allergies     He has No Known Allergies. Physical Exam     INITIAL VITALS: BP: (!) 153/97, Temp: 97.8 °F (36.6 °C), Heart Rate: 75,  , SpO2: 98 %  Physical Exam  Vitals and nursing note reviewed. Constitutional:       General: He is not in acute distress. Appearance: He is well-developed. HENT:      Head: Normocephalic and atraumatic. Right Ear: Tympanic membrane and external ear normal.      Left Ear: Tympanic membrane and external ear normal.      Nose: Nose normal.      Mouth/Throat:      Mouth: Mucous membranes are moist.      Comments: He has some cobblestoning and erythema in the posterior pharynx without edema or exudate. Eyes:      General:         Right eye: No discharge. Left eye: No discharge. Extraocular Movements: Extraocular movements intact. Conjunctiva/sclera: Conjunctivae normal.      Pupils: Pupils are equal, round, and reactive to light. Cardiovascular:      Rate and Rhythm: Normal rate and regular rhythm. Pulses: Normal pulses. Heart sounds: Normal heart sounds. No murmur heard. Pulmonary:      Effort: Pulmonary effort is normal.      Breath sounds: Normal breath sounds. No wheezing or rhonchi. Abdominal:      General: Bowel sounds are normal.      Palpations: Abdomen is soft. Tenderness: There is no abdominal tenderness. There is no guarding or rebound. Musculoskeletal:         General: Normal range of motion.       Cervical back: Normal range of motion and neck supple. Skin:     General: Skin is warm and dry. Capillary Refill: Capillary refill takes less than 2 seconds. Neurological:      General: No focal deficit present. Mental Status: He is alert and oriented to person, place, and time. Sensory: No sensory deficit. Deep Tendon Reflexes: Reflexes normal.   Psychiatric:         Mood and Affect: Mood normal.         Behavior: Behavior normal.         Thought Content: Thought content normal.         Judgment: Judgment normal.       Diagnostic Results     EKG   Interpreted in conjunction with emergency department physician Yuly Olivarez MD  Normal sinus rhythm with a rate of 77, normal intervals, normal axis. QTc 423. No ST elevation or depression. No ischemic patterns. RADIOLOGY:  No orders to display       LABS:   Results for orders placed or performed during the hospital encounter of 22   EKG 12 Lead   Result Value Ref Range    Ventricular Rate 77 BPM    Atrial Rate 77 BPM    P-R Interval 136 ms    QRS Duration 86 ms    Q-T Interval 374 ms    QTc Calculation (Bazett) 423 ms    P Axis 25 degrees    R Axis 19 degrees    T Axis 23 degrees    Diagnosis       EKG performed in ER and to be interpreted by ER physician. Confirmed by MD, ER ([id number]),   [address] ([phone]) on 2022 9:30:39 AM       ED BEDSIDE ULTRASOUND:  No results found. RECENT VITALS:  BP: (!) 153/97, Temp: 97.8 °F (36.6 °C), Heart Rate: 75,  , SpO2: 98 %     Procedures         ED Course     Nursing Notes, Past Medical Hx,Past Surgical Hx, Social Hx, Allergies, and Family Hx were reviewed.          The patient was given the following medications:  Orders Placed This Encounter   Medications    guaiFENesin (MUCINEX) 600 MG extended release tablet     Sig: Take 2 tablets by mouth 2 times daily for 10 days     Dispense:  40 tablet     Refill:  0    fluticasone (FLONASE) 50 MCG/ACT nasal spray     Si sprays by Each Nostril route daily     Dispense:  16 g     Refill:  0    sodium chloride (ALTAMIST SPRAY) 0.65 % nasal spray     Si spray by Nasal route as needed for Congestion     Dispense:  1 each     Refill:  0       CONSULTS:  None    MEDICAL DECISION MAKING / ASSESSMENT / Loc Bryan is a 23 y.o. male who presented to the emergency department with left-sided sinus congestion and pressure. On examination here he has some cobblestoning and erythema in the posterior pharynx with no evidence of pharyngitis. He also had some tenderness over the maxillary and frontal sinuses on the left. Ears are unremarkable. He is already on a Z-Mohit and I will have him continue this medication. I added on Mucinex, Flonase as well as saline nasal spray. He can alternate Tylenol or Motrin if needed for pain and fevers. In regards to his chest pain he states this was right-sided, lasted a few seconds and resolved. He had no associated diaphoresis, shortness of breath or pain radiation. He states he has had episodes of palpitations and tachycardia in the past, on the left side, this feels completely different. My concern for ACS or other cardiac etiology is low in this patient. He will be discharged home to follow-up with his primary care physician however is to return to the emergency department for worsening symptoms or concerns as discussed. This patient was also evaluated by the attending physician. All care plans were discussed and agreed upon. Clinical Impression     1.  Sinus congestion        Disposition     PATIENT REFERRED TO:  PCP of your choice from list provided    Call   for follow up    DISCHARGE MEDICATIONS:  New Prescriptions    FLUTICASONE (FLONASE) 50 MCG/ACT NASAL SPRAY    2 sprays by Each Nostril route daily    GUAIFENESIN (MUCINEX) 600 MG EXTENDED RELEASE TABLET    Take 2 tablets by mouth 2 times daily for 10 days    SODIUM CHLORIDE (ALTAMIST SPRAY) 0.65 % NASAL SPRAY    1 spray by Nasal route as needed for Congestion       DISPOSITION Decision To Discharge 11/20/2022 10:41:27 AM       TIFFANI Cheng  11/20/22 114